# Patient Record
Sex: MALE | Race: OTHER | HISPANIC OR LATINO
[De-identification: names, ages, dates, MRNs, and addresses within clinical notes are randomized per-mention and may not be internally consistent; named-entity substitution may affect disease eponyms.]

---

## 2023-12-20 PROBLEM — Z00.00 ENCOUNTER FOR PREVENTIVE HEALTH EXAMINATION: Status: ACTIVE | Noted: 2023-12-20

## 2023-12-21 ENCOUNTER — APPOINTMENT (OUTPATIENT)
Dept: BARIATRICS | Facility: CLINIC | Age: 33
End: 2023-12-21
Payer: COMMERCIAL

## 2023-12-21 VITALS
WEIGHT: 164 LBS | HEIGHT: 72 IN | BODY MASS INDEX: 22.21 KG/M2 | HEART RATE: 97 BPM | SYSTOLIC BLOOD PRESSURE: 142 MMHG | TEMPERATURE: 99.1 F | DIASTOLIC BLOOD PRESSURE: 85 MMHG | OXYGEN SATURATION: 98 %

## 2023-12-21 DIAGNOSIS — Z80.0 FAMILY HISTORY OF MALIGNANT NEOPLASM OF DIGESTIVE ORGANS: ICD-10-CM

## 2023-12-21 DIAGNOSIS — Z78.9 OTHER SPECIFIED HEALTH STATUS: ICD-10-CM

## 2023-12-21 PROCEDURE — 99204 OFFICE O/P NEW MOD 45 MIN: CPT

## 2023-12-23 PROBLEM — Z80.0 FAMILY HISTORY OF MALIGNANT NEOPLASM OF COLON: Status: ACTIVE | Noted: 2023-12-21

## 2023-12-23 PROBLEM — Z78.9 NON-SMOKER: Status: ACTIVE | Noted: 2023-12-21

## 2023-12-23 PROBLEM — Z78.9 CAFFEINE USE: Status: ACTIVE | Noted: 2023-12-21

## 2023-12-23 PROBLEM — Z78.9 SOCIAL ALCOHOL USE: Status: ACTIVE | Noted: 2023-12-21

## 2023-12-23 PROBLEM — Z80.0 FAMILY HISTORY OF LIVER CANCER: Status: ACTIVE | Noted: 2023-12-21

## 2023-12-23 PROBLEM — Z80.0 FAMILY HISTORY OF PANCREATIC CANCER: Status: ACTIVE | Noted: 2023-12-21

## 2023-12-23 NOTE — ASSESSMENT
[FreeTextEntry1] : In summary, Mr. KAELA MATHIS has a symptomatic umbilical hernia for which I recommend surgical repair at the patient's earliest convenience. I discussed the risks and benefits including, but not limited to, injury to intra-abdominal organs, bleeding, infection, use of mesh and recurrent hernia. We discussed the role and complications of mesh at length. I also discussed the normal brandon- and post-operative course. I answered all questions about this surgery and possible complications to the best of my ability and the patient verbalized understanding. We discussed the possible need for drains as well. Should unrelenting symptoms or signs of incarceration develop prior to surgery the patient knows to call or go to the emergency room.  Plan for open umbilical hernia repair, possible mesh Preop labs, EKG, CXR Wishes to schedule for 2024  I, Dr. Dorcas Molina, spent 50 minutes with the patient >50% counseling/coordination of care including, reviewing the patient's history, performing an examination, reviewing relevant labs and radiographic imaging, reviewing PCP and consultant notes, discussion of medical and surgical management of the diagnosis as well as associated risks and benefits, and completing documentation.

## 2023-12-23 NOTE — HISTORY OF PRESENT ILLNESS
[de-identified] : Patient is a 33 year old gentleman without significant medical history who presents for evaluation of symptomatic umbilical hernia. He reports that he has had several month history of umbilical bulge associated with intermittent pain and discomfort. Denies any issues with oral intake and otherwise having normal bowel function. No fevers, chills, chest pain, dyspnea, dysuria, hematochezia, melena. At time of evaluation, afebrile, hemodynamically stable, abdomen soft, umbilical hernia present, mild tenderness, nondistended, no rebound or guarding.

## 2024-05-29 ENCOUNTER — APPOINTMENT (OUTPATIENT)
Dept: SURGERY | Facility: CLINIC | Age: 34
End: 2024-05-29
Payer: COMMERCIAL

## 2024-05-29 ENCOUNTER — RESULT REVIEW (OUTPATIENT)
Age: 34
End: 2024-05-29

## 2024-05-29 ENCOUNTER — OUTPATIENT (OUTPATIENT)
Dept: OUTPATIENT SERVICES | Facility: HOSPITAL | Age: 34
LOS: 1 days | End: 2024-05-29
Payer: COMMERCIAL

## 2024-05-29 ENCOUNTER — LABORATORY RESULT (OUTPATIENT)
Age: 34
End: 2024-05-29

## 2024-05-29 VITALS
OXYGEN SATURATION: 98 % | BODY MASS INDEX: 21.67 KG/M2 | TEMPERATURE: 98.4 F | HEIGHT: 72 IN | DIASTOLIC BLOOD PRESSURE: 79 MMHG | WEIGHT: 160 LBS | SYSTOLIC BLOOD PRESSURE: 118 MMHG | HEART RATE: 80 BPM

## 2024-05-29 DIAGNOSIS — Z01.818 ENCOUNTER FOR OTHER PREPROCEDURAL EXAMINATION: ICD-10-CM

## 2024-05-29 PROCEDURE — 71046 X-RAY EXAM CHEST 2 VIEWS: CPT

## 2024-05-29 PROCEDURE — 93010 ELECTROCARDIOGRAM REPORT: CPT | Mod: NC

## 2024-05-29 PROCEDURE — 71046 X-RAY EXAM CHEST 2 VIEWS: CPT | Mod: 26

## 2024-05-29 PROCEDURE — 99214 OFFICE O/P EST MOD 30 MIN: CPT

## 2024-05-29 PROCEDURE — 93005 ELECTROCARDIOGRAM TRACING: CPT

## 2024-05-31 NOTE — ASSESSMENT
[FreeTextEntry1] : In summary, Mr. KAELA MATHIS has a symptomatic umbilical hernia for which I recommend surgical repair at the patient's earliest convenience. I discussed the risks and benefits including, but not limited to, injury to intra-abdominal organs, bleeding, infection, use of mesh and recurrent hernia. We discussed the role and complications of mesh at length. I also discussed the normal brandon- and post-operative course. I answered all questions about this surgery and possible complications to the best of my ability and the patient verbalized understanding. We discussed the possible need for drains as well. Should unrelenting symptoms or signs of incarceration develop prior to surgery the patient knows to call or go to the emergency room.  Plan for open umbilical hernia repair, possible mesh Preop labs, EKG, CXR  I, Dr. Dorcas Molina, spent 35 minutes with the patient >50% counseling/coordination of care including, reviewing the patient's history, performing an examination, reviewing relevant labs and radiographic imaging, reviewing PCP and consultant notes, discussion of medical and surgical management of the diagnosis as well as associated risks and benefits, and completing documentation.

## 2024-05-31 NOTE — HISTORY OF PRESENT ILLNESS
[de-identified] : Patient is a 33 year old gentleman without significant medical history who presents for evaluation of symptomatic umbilical hernia. He reports that he has had several month history of umbilical bulge associated with intermittent pain and discomfort. Denies any issues with oral intake and otherwise having normal bowel function. No fevers, chills, chest pain, dyspnea, dysuria, hematochezia, melena. At time of evaluation, afebrile, hemodynamically stable, abdomen soft, umbilical hernia present, mild tenderness, nondistended, no rebound or guarding.

## 2024-06-07 NOTE — ASU PATIENT PROFILE, ADULT - ANESTHESIA, PREVIOUS REACTION, PROFILE
NEVER HAD ANESTHESIA BEFORE/unknown Spine appears normal, range of motion is not limited, no muscle or joint tenderness

## 2024-06-07 NOTE — ASU PATIENT PROFILE, ADULT - NSICDXPASTSURGICALHX_GEN_ALL_CORE_FT
PAST SURGICAL HISTORY:  No significant past surgical history PAST SURGICAL HISTORY:  History of ankle surgery right, at age 15, s/p fx

## 2024-06-09 ENCOUNTER — TRANSCRIPTION ENCOUNTER (OUTPATIENT)
Age: 34
End: 2024-06-09

## 2024-06-10 ENCOUNTER — APPOINTMENT (OUTPATIENT)
Dept: BARIATRICS | Facility: HOSPITAL | Age: 34
End: 2024-06-10

## 2024-06-10 ENCOUNTER — OUTPATIENT (OUTPATIENT)
Dept: OUTPATIENT SERVICES | Facility: HOSPITAL | Age: 34
LOS: 1 days | Discharge: HOME | End: 2024-06-10
Payer: COMMERCIAL

## 2024-06-10 ENCOUNTER — TRANSCRIPTION ENCOUNTER (OUTPATIENT)
Age: 34
End: 2024-06-10

## 2024-06-10 ENCOUNTER — RESULT REVIEW (OUTPATIENT)
Age: 34
End: 2024-06-10

## 2024-06-10 VITALS
DIASTOLIC BLOOD PRESSURE: 81 MMHG | RESPIRATION RATE: 16 BRPM | OXYGEN SATURATION: 97 % | HEART RATE: 50 BPM | HEIGHT: 72 IN | TEMPERATURE: 98 F | WEIGHT: 155.65 LBS | SYSTOLIC BLOOD PRESSURE: 123 MMHG

## 2024-06-10 VITALS
OXYGEN SATURATION: 99 % | SYSTOLIC BLOOD PRESSURE: 132 MMHG | HEART RATE: 58 BPM | DIASTOLIC BLOOD PRESSURE: 87 MMHG | TEMPERATURE: 98 F | RESPIRATION RATE: 17 BRPM

## 2024-06-10 DIAGNOSIS — Z98.890 OTHER SPECIFIED POSTPROCEDURAL STATES: Chronic | ICD-10-CM

## 2024-06-10 PROCEDURE — 88302 TISSUE EXAM BY PATHOLOGIST: CPT | Mod: 26

## 2024-06-10 PROCEDURE — 49592 RPR AA HRN 1ST < 3 NCR/STRN: CPT

## 2024-06-10 PROCEDURE — 88302 TISSUE EXAM BY PATHOLOGIST: CPT

## 2024-06-10 DEVICE — MESH HERNIA VENTRALEX ST MEDIUM 2.5": Type: IMPLANTABLE DEVICE | Status: FUNCTIONAL

## 2024-06-10 DEVICE — MESH HERNIA VENTRALEX ST LARGE 3.2": Type: IMPLANTABLE DEVICE | Status: FUNCTIONAL

## 2024-06-10 DEVICE — STAPLER COVIDIEN TRI-STAPLE 60MM PURPLE RELOAD: Type: IMPLANTABLE DEVICE | Status: FUNCTIONAL

## 2024-06-10 DEVICE — MESH PHASIX ST 6X8IN: Type: IMPLANTABLE DEVICE | Status: FUNCTIONAL

## 2024-06-10 DEVICE — STAPLER ETHICON PROXIMATE RELOAD 75MM BLUE: Type: IMPLANTABLE DEVICE | Status: FUNCTIONAL

## 2024-06-10 DEVICE — MESH HERNIA VENTRALEX ST SMALL 1.7": Type: IMPLANTABLE DEVICE | Status: FUNCTIONAL

## 2024-06-10 DEVICE — STAPLER ETHICON PROXIMATE 75MM WITH BLUE RELOAD: Type: IMPLANTABLE DEVICE | Status: FUNCTIONAL

## 2024-06-10 RX ORDER — OXYCODONE HYDROCHLORIDE 5 MG/1
1 TABLET ORAL
Qty: 15 | Refills: 0
Start: 2024-06-10 | End: 2024-06-13

## 2024-06-10 RX ORDER — ACETAMINOPHEN 500 MG
1000 TABLET ORAL ONCE
Refills: 0 | Status: DISCONTINUED | OUTPATIENT
Start: 2024-06-10 | End: 2024-06-10

## 2024-06-10 NOTE — PROGRESS NOTE ADULT - SUBJECTIVE AND OBJECTIVE BOX
Patient was walking in the hallway of her apartment building when she tripped on her own feet on the carpet floor and hit the right side of her head on a brick wall and fell onto her face, skin tear to right hand and right wrist, abrasions and swelling noted to upper and lower lips, patient c/o pressure at the base of her neck, no LOC Procedure: Umbilical hernia repair  Surgeon: Dr. Molina    S: Pt seen and examined bedside in the PACU. He has no acute complaints and denies pain. He has not eaten anything yet. Denies CP, SOB, SHEPARD, calf tenderness or edema, nausea, emesis, or fevers.    O:  T(C): 36.3 (06-10-24 @ 17:43), Max: 36.4 (06-10-24 @ 15:46)  T(F): 97.3 (06-10-24 @ 17:43), Max: 97.5 (06-10-24 @ 15:46)  HR: 58 (06-10-24 @ 18:52) (50 - 88)  BP: 132/87 (06-10-24 @ 18:52) (117/70 - 132/87)  RR: 17 (06-10-24 @ 18:52) (12 - 19)  SpO2: 99% (06-10-24 @ 18:52) (96% - 99%)  Wt(kg): --        Gen: NAD, resting comfortably in bed  C/V: NSR  Pulm: Nonlabored breathing, no respiratory distress  Abd: soft, NTND Incision: Incision is c/d/i with dermabond  Extrem: WWP, no calf edema or tenderness      A/P: 33M, no sig PMHx, s/p umbillical hernia repair w/o complications on 6/10.    Regular diet  Pain/nausea control PRN  DC home

## 2024-06-10 NOTE — ASU DISCHARGE PLAN (ADULT/PEDIATRIC) - CARE PROVIDER_API CALL
Dorcas Molina  Surgery  [address], [address]  [place] [address]  Phone: [phone]  Fax: [phone]  Follow Up Time: 2 weeks

## 2024-06-21 LAB — SURGICAL PATHOLOGY STUDY: SIGNIFICANT CHANGE UP

## 2024-06-24 ENCOUNTER — APPOINTMENT (OUTPATIENT)
Dept: BARIATRICS | Facility: CLINIC | Age: 34
End: 2024-06-24
Payer: COMMERCIAL

## 2024-06-24 VITALS
OXYGEN SATURATION: 98 % | DIASTOLIC BLOOD PRESSURE: 80 MMHG | TEMPERATURE: 98 F | SYSTOLIC BLOOD PRESSURE: 136 MMHG | RESPIRATION RATE: 16 BRPM | HEART RATE: 64 BPM

## 2024-06-24 DIAGNOSIS — K42.9 UMBILICAL HERNIA W/OUT OBSTRUCTION OR GANGRENE: ICD-10-CM

## 2024-06-24 PROCEDURE — 99214 OFFICE O/P EST MOD 30 MIN: CPT

## 2024-07-22 ENCOUNTER — APPOINTMENT (OUTPATIENT)
Dept: BARIATRICS | Facility: CLINIC | Age: 34
End: 2024-07-22
Payer: COMMERCIAL

## 2024-07-22 VITALS
SYSTOLIC BLOOD PRESSURE: 117 MMHG | HEIGHT: 72 IN | OXYGEN SATURATION: 99 % | WEIGHT: 157 LBS | HEART RATE: 52 BPM | BODY MASS INDEX: 21.26 KG/M2 | TEMPERATURE: 97.8 F | DIASTOLIC BLOOD PRESSURE: 76 MMHG

## 2024-07-22 DIAGNOSIS — K42.9 UMBILICAL HERNIA W/OUT OBSTRUCTION OR GANGRENE: ICD-10-CM

## 2024-07-22 PROCEDURE — 99214 OFFICE O/P EST MOD 30 MIN: CPT

## 2024-07-22 NOTE — HISTORY OF PRESENT ILLNESS
[de-identified] : Pt is a 34 y/o M 6 weeks s/p umbilical hernia repair who presents today feeling well here for post op care. Pt reports feeling great overall and denies any abd pn,n,v,c,d, and states he has resumed working without difficulty and continues to recover more each week. Pt states he has been hiking and walking for exercise and we discussed today reintroducing running and lifting as tolerated. Recommended starting with lower weights and slowly increasing his tolerance and recommended activity progression as tolerated. Pt advised to call us for any concerns. No other concerns at this time.

## 2024-07-22 NOTE — ASSESSMENT
[FreeTextEntry1] : Pt is a 34 y/o M 6 weeks s/p umbilical hernia repair who presents today feeling well here for post op care. Pt reports feeling great overall and denies any abd pn,n,v,c,d, and states he has resumed working without difficulty and continues to recover more each week. Pt states he has been hiking and walking for exercise and we discussed today reintroducing running and lifting as tolerated. Recommended starting with lower weights and slowly increasing his tolerance and recommended activity progression as tolerated. Pt advised to call us for any concerns. No other concerns at this time.

## (undated) DEVICE — RETAINER VICERA FISH LG

## (undated) DEVICE — SUT VICRYL 2-0 27" SH UNDYED

## (undated) DEVICE — SUSPENSORY WITH LEG STRAPS LARGE

## (undated) DEVICE — Device

## (undated) DEVICE — STAPLER COVIDIEN ENDO GIA SHORT HANDLE

## (undated) DEVICE — VENODYNE/SCD SLEEVE CALF MEDIUM

## (undated) DEVICE — PACK EXPLORATORY LAPAROTOMY

## (undated) DEVICE — WARMING BLANKET UPPER ADULT

## (undated) DEVICE — SUT MONOCRYL 4-0 18" PS-2

## (undated) DEVICE — SUT VICRYL 0 27" UR-6

## (undated) DEVICE — FOLEY TRAY 16FR 5CC LF UMETER CLOSED

## (undated) DEVICE — SUT ETHIBOND 0 30" CT-2

## (undated) DEVICE — SUT STRATAFIX SYMMETRIC PDS PLUS 1 45CM CT-1

## (undated) DEVICE — DRAPE 3/4 SHEET 52X76"

## (undated) DEVICE — SUT PROLENE 0 30" CT-2

## (undated) DEVICE — DRSG XEROFORM 1 X 8"

## (undated) DEVICE — DRAPE 1/2 SHEET 40X57"

## (undated) DEVICE — SUT VICRYL 2-0 27" SH

## (undated) DEVICE — DRSG DERMABOND 0.7ML

## (undated) DEVICE — STAPLER SKIN PROXIMATE

## (undated) DEVICE — GOWN XL

## (undated) DEVICE — LIGASURE IMPACT

## (undated) DEVICE — DRSG SUPPORTER ADULT 3" WAISTBAND MED

## (undated) DEVICE — BLADE SURGICAL #15 CARBON

## (undated) DEVICE — ABDOMINAL BINDER MED/LG 9" X 36"-64"

## (undated) DEVICE — DRAPE IOBAN 33" X 23"

## (undated) DEVICE — GLV 7.5 PROTEXIS (WHITE)

## (undated) DEVICE — PREP CHLORAPREP HI-LITE ORANGE 26ML

## (undated) DEVICE — POSITIONER FOAM EGG CRATE ULNAR 2PCS (PINK)

## (undated) DEVICE — GLV 7 PROTEXIS (WHITE)

## (undated) DEVICE — SUT PDS II 0 27" CT-1

## (undated) DEVICE — DRAIN JACKSON PRATT 19FR ROUND END W TROCAR